# Patient Record
Sex: MALE | Race: BLACK OR AFRICAN AMERICAN | NOT HISPANIC OR LATINO | Employment: FULL TIME | URBAN - METROPOLITAN AREA
[De-identification: names, ages, dates, MRNs, and addresses within clinical notes are randomized per-mention and may not be internally consistent; named-entity substitution may affect disease eponyms.]

---

## 2023-11-17 ENCOUNTER — APPOINTMENT (OUTPATIENT)
Dept: PRIMARY CARE | Facility: CLINIC | Age: 33
End: 2023-11-17
Payer: COMMERCIAL

## 2024-01-02 ENCOUNTER — APPOINTMENT (OUTPATIENT)
Dept: PRIMARY CARE | Facility: CLINIC | Age: 34
End: 2024-01-02
Payer: COMMERCIAL

## 2024-01-11 ENCOUNTER — APPOINTMENT (OUTPATIENT)
Dept: PRIMARY CARE | Facility: CLINIC | Age: 34
End: 2024-01-11
Payer: COMMERCIAL

## 2024-01-12 ENCOUNTER — APPOINTMENT (OUTPATIENT)
Dept: CARDIOLOGY | Facility: HOSPITAL | Age: 34
End: 2024-01-12
Payer: COMMERCIAL

## 2024-01-12 ENCOUNTER — HOSPITAL ENCOUNTER (EMERGENCY)
Facility: HOSPITAL | Age: 34
Discharge: HOME | End: 2024-01-12
Attending: EMERGENCY MEDICINE
Payer: COMMERCIAL

## 2024-01-12 ENCOUNTER — APPOINTMENT (OUTPATIENT)
Dept: RADIOLOGY | Facility: HOSPITAL | Age: 34
End: 2024-01-12
Payer: COMMERCIAL

## 2024-01-12 VITALS
SYSTOLIC BLOOD PRESSURE: 171 MMHG | BODY MASS INDEX: 41.58 KG/M2 | HEIGHT: 71 IN | DIASTOLIC BLOOD PRESSURE: 87 MMHG | WEIGHT: 297 LBS | TEMPERATURE: 98.1 F | HEART RATE: 87 BPM | RESPIRATION RATE: 16 BRPM | OXYGEN SATURATION: 97 %

## 2024-01-12 DIAGNOSIS — R09.81 NASAL CONGESTION: ICD-10-CM

## 2024-01-12 DIAGNOSIS — R05.1 ACUTE COUGH: ICD-10-CM

## 2024-01-12 DIAGNOSIS — J10.1 INFLUENZA A: Primary | ICD-10-CM

## 2024-01-12 LAB
FLUAV RNA RESP QL NAA+PROBE: DETECTED
FLUBV RNA RESP QL NAA+PROBE: NOT DETECTED
RSV RNA RESP QL NAA+PROBE: NOT DETECTED
SARS-COV-2 RNA RESP QL NAA+PROBE: NOT DETECTED
TROPONIN T SERPL-MCNC: 6 NG/L

## 2024-01-12 PROCEDURE — 2500000002 HC RX 250 W HCPCS SELF ADMINISTERED DRUGS (ALT 637 FOR MEDICARE OP, ALT 636 FOR OP/ED): Performed by: PHYSICIAN ASSISTANT

## 2024-01-12 PROCEDURE — 94640 AIRWAY INHALATION TREATMENT: CPT

## 2024-01-12 PROCEDURE — 87502 INFLUENZA DNA AMP PROBE: CPT | Performed by: PHYSICIAN ASSISTANT

## 2024-01-12 PROCEDURE — 93005 ELECTROCARDIOGRAM TRACING: CPT

## 2024-01-12 PROCEDURE — 99283 EMERGENCY DEPT VISIT LOW MDM: CPT | Performed by: EMERGENCY MEDICINE

## 2024-01-12 PROCEDURE — 84484 ASSAY OF TROPONIN QUANT: CPT | Performed by: EMERGENCY MEDICINE

## 2024-01-12 PROCEDURE — 36415 COLL VENOUS BLD VENIPUNCTURE: CPT | Performed by: EMERGENCY MEDICINE

## 2024-01-12 PROCEDURE — 71046 X-RAY EXAM CHEST 2 VIEWS: CPT

## 2024-01-12 RX ORDER — ALBUTEROL SULFATE 90 UG/1
2 AEROSOL, METERED RESPIRATORY (INHALATION) EVERY 6 HOURS PRN
Qty: 18 G | Refills: 0 | Status: SHIPPED | OUTPATIENT
Start: 2024-01-12 | End: 2024-02-11

## 2024-01-12 RX ORDER — BENZONATATE 100 MG/1
100 CAPSULE ORAL 3 TIMES DAILY PRN
Qty: 21 CAPSULE | Refills: 0 | Status: SHIPPED | OUTPATIENT
Start: 2024-01-12

## 2024-01-12 RX ORDER — IPRATROPIUM BROMIDE AND ALBUTEROL SULFATE 2.5; .5 MG/3ML; MG/3ML
3 SOLUTION RESPIRATORY (INHALATION) ONCE
Status: COMPLETED | OUTPATIENT
Start: 2024-01-12 | End: 2024-01-12

## 2024-01-12 RX ORDER — GUAIFENESIN 600 MG/1
600 TABLET, EXTENDED RELEASE ORAL 2 TIMES DAILY
Qty: 14 TABLET | Refills: 0 | Status: SHIPPED | OUTPATIENT
Start: 2024-01-12 | End: 2024-01-19

## 2024-01-12 RX ADMIN — IPRATROPIUM BROMIDE AND ALBUTEROL SULFATE 3 ML: 2.5; .5 SOLUTION RESPIRATORY (INHALATION) at 16:40

## 2024-01-12 ASSESSMENT — COLUMBIA-SUICIDE SEVERITY RATING SCALE - C-SSRS
6. HAVE YOU EVER DONE ANYTHING, STARTED TO DO ANYTHING, OR PREPARED TO DO ANYTHING TO END YOUR LIFE?: NO
1. IN THE PAST MONTH, HAVE YOU WISHED YOU WERE DEAD OR WISHED YOU COULD GO TO SLEEP AND NOT WAKE UP?: NO
2. HAVE YOU ACTUALLY HAD ANY THOUGHTS OF KILLING YOURSELF?: NO

## 2024-01-12 NOTE — Clinical Note
James Fragoso was seen and treated in our emergency department on 1/12/2024.  He may return to work on 01/15/2024.       If you have any questions or concerns, please don't hesitate to call.      Apoorva Washington PA-C

## 2024-01-12 NOTE — DISCHARGE INSTRUCTIONS
*Please follow-up with your primary care doctor in 3 days.  Please take medications as prescribed.  Ensure adequate hydration at home.  Please use Tylenol over-the-counter as needed for muscle aches, or fever    Please return to the emergency department with new or worsening symptoms or the onset of new symptoms.

## 2024-01-12 NOTE — ED PROVIDER NOTES
HPI   Chief Complaint   Patient presents with    Cough     Cough/Congestion/Running nose x 2 weeks. Seen by the PCP last week. COVID & Flu was negative. CXR was done       33-year-old male with past medical history of hypertension who takes no daily meds presenting the emergency department complaints of ongoing sinus congestion and cough x 8 days.  Patient states that his symptoms started last Wednesday.  He went to the urgent care the following Thursday and had a chest x-ray, flu, and COVID-19 testing.  He states that all testing was negative and he was prescribed ibuprofen.  He states over the course of the week, his nasal congestion has been ongoing and worsening.  He has a pressure in his face and feels a lot of mucus doing on his throat.  He states he has also had a continued cough with yellow sputum.  He states that he has had on and off subjective fever and chills.  He denies shortness of breath.  He denies nausea, vomiting, abdominal pain, diarrhea, or constipation.  He states that he has discomfort in his chest and is coughing.  No recent travel, history of cancer, history of clots in his lungs or legs, hormonal use, recent surgeries or traumas, and no hemoptysis.        Please see HPI for pertinent positive and negative ROS.                 No data recorded                Patient History   No past medical history on file.  No past surgical history on file.  No family history on file.  Social History     Tobacco Use    Smoking status: Not on file    Smokeless tobacco: Not on file   Substance Use Topics    Alcohol use: Not on file    Drug use: Not on file       Physical Exam   ED Triage Vitals [01/12/24 1427]   Temp Heart Rate Resp BP   36.7 °C (98.1 °F) 87 16 171/87      SpO2 Temp Source Heart Rate Source Patient Position   97 % Skin -- Sitting      BP Location FiO2 (%)     Left arm --       Physical Exam  GENERAL APPEARANCE: Awake and alert. No acute distress.   VITAL SIGNS: As per the nurses' triage  record.  HEENT: Normocephalic, atraumatic. Extraocular muscles are intact. Conjunctiva are pink. Negative scleral icterus. Mucous membranes are moist. Tongue in the midline. Oropharynx clear, uvula midline.  Postnasal drip appreciated.  TMs dull and intact bilaterally.  External auditory canals are clear bilaterally.  No mastoid bone erythema or edema bilaterally.  NECK: Soft, nontender and supple, full gross ROM, no meningeal signs.  CHEST: Nontender to palpation.  Wheezing in right lower lobe and left lower lobe.  Symmetric rise and fall of chest wall.   HEART: Clear S1 and S2. Regular rate and rhythm. No murmurs appreciated on auscultation.  Strong and equal pulses in the extremities.  ABDOMEN: Soft, nontender, nondistended, positive bowel sounds, no palpable masses.  MUSCULOSKELETAL: The calves are nontender to palpation. Full gross active range of motion. Ambulating on own with no acute difficulties  NEUROLOGICAL: Awake, alert and oriented x 3. Motor power intact in the upper and lower extremities. Sensation is intact to light touch in the upper and lower extremities. Patient answering questions appropriately.   IMMUNOLOGICAL: No lymphatic streaking noted  DERMATOLOGIC: Warm and dry without petechiae, rashes, or ecchymosis noted on visible skin.   PYSCH: Cooperative with appropriate mood and affect.  ED Course & MDM   ED Course as of 01/12/24 1852 Fri Jan 12, 2024   1637 Chest x-ray shows distended small bowel loop in the left upper quadrant.  Patient states that he has no abdominal pain.  He denies any nausea or vomiting.  He denies diarrhea or constipation.  He states that he has been having bowel movements at baseline.  [SC]   1838 Initial Troponin T HS 6, chest pain with coughing and heavy lifting has been for one week. Repeat Troponin T HS not indicated  [SC]      ED Course User Index  [SC] Apoorva Washington PA-C         Diagnoses as of 01/12/24 1852   Influenza A   Acute cough   Nasal congestion        Medical Decision Making  Parts of this chart have been completed using voice recognition software. Please excuse any errors of transcription.  My thought process and reason for plan has been formulated from the time that I saw the patient until the time of disposition and is not specific to one specific moment during their visit and furthermore my MDM encompasses this entire chart and not only this text box.      HPI: Detailed above.    Exam: A medically appropriate exam performed, outlined above, given the known history and presentation.    History obtained from: Patient    Social Determinants of Health considered during this visit: Lives at home    Medications given during visit:  Medications   ipratropium-albuteroL (Duo-Neb) 0.5-2.5 mg/3 mL nebulizer solution 3 mL (3 mL nebulization Given 1/12/24 1640)        Diagnostic/tests  Labs Reviewed   INFLUENZA A AND B PCR - Abnormal       Result Value    Flu A Result Detected (*)     Flu B Result Not Detected      Narrative:     This assay is an in vitro diagnostic multiplex nucleic acid amplification test for the detection and discrimination of Influenza A & B from nasopharyngeal specimens, and has been validated for use at [facility]. Negative results do not preclude Influenza A/B infections, and should not be used as the sole basis for diagnosis, treatment, or other management decisions. If Influenza A/B and RSV PCR results are negative, testing for Parainfluenza virus, Adenovirus and Metapneumovirus is routinely performed for [facility] pediatric oncology and intensive care inpatients, and is available on other patients by placing an add-on request.   SARS-COV-2 PCR, SYMPTOMATIC - Normal    Coronavirus 2019, PCR Not Detected      Narrative:     This assay has received FDA Emergency Use Authorization (EUA) and is only authorized for the duration of time that circumstances exist to justify the authorization of the emergency use of in vitro  diagnostic tests for the detection of SARS-CoV-2 virus and/or diagnosis of COVID-19 infection under section 564(b)(1) of the Act, 21 U.S.C. 360bbb-3(b)(1). This assay is an in vitro diagnostic nucleic acid amplification test for the qualitative detection of SARS-CoV-2 from nasopharyngeal specimens and has been validated for use at [facility]. Negative results do not preclude COVID-19 infections and should not be used as the sole basis for diagnosis, treatment, or other management decisions.     RSV PCR - Normal    RSV PCR Not Detected      Narrative:     This assay is an FDA-cleared, in vitro diagnostic nucleic acid amplification test for the detection of RSV from nasopharyngeal specimens, and has been validated for use at [facility]. Negative results do not preclude RSV infections, and should not be used as the sole basis for diagnosis, treatment, or other management decisions. If Influenza A/B and RSV PCR results are negative, testing for Parainfluenza virus, Adenovirus and Metapneumovirus is routinely performed for pediatric oncology and intensive care inpatients at [facility], and is available on other patients by placing an add-on request.       SERIAL TROPONIN, INITIAL ([facility]) - Normal    Troponin T, High Sensitivity 6     TROPONIN T SERIES, HIGH SENSITIVITY (0, 2 HR, 6 HR)    Narrative:     The following orders were created for panel order Troponin T Series, High Sensitivity (0, 2HR, 6HR).  Procedure                               Abnormality         Status                     ---------                               -----------         ------                     Serial Troponin, Initial...[[id number]]  Normal              Final result               Serial Troponin, 2 Hour ...[[id number]]                                                   Please view results for these tests on the individual orders.   SERIAL TROPONIN,  2 HOUR ([facility])      XR chest 2 views   Final Result   No  acute cardiopulmonary disease.        Distended small bowel loop within left upper quadrant. Clinical   correlation is necessary.        Signed by: Jazmine Tafoya 1/12/2024 4:30 PM   Dictation workstation:   [id number]           Considerations/further MDM:    Patient was seen in conjucntion with my supervising physician,  Dr. Larry. Please refer to his note.    Considered and evaluated for acute bronchitis, acute pneumonia, pneumothorax, acute nasopharyngitis, acute sinusitis, acute tonsillitis, viral syndrome including - COVID-19, influenza A, influenza B, RSV    PERC negative for Pulmonary Embolism     Patient positive for influenza A.  Chest x-ray shows no pneumonia.  After breathing treatment, patient states symptoms are improved.  He still had wheezing and bilateral lower lobes.  He will be treated with benzonatate, albuterol, Mucinex for influenza A.  He was told to follow-up with his primary care doctor in 3 days.  He was told to return the emerged part medially with new or worsening symptoms with the onset of new symptoms.  Patient verbalized understanding and was comfortable discharge plan home.  He was released in good condition.      Procedure  Procedures     Apoorva Washington PA-C  01/12/24 8172

## 2024-01-15 LAB
ATRIAL RATE: 72 BPM
P AXIS: 41 DEGREES
P OFFSET: 174 MS
P ONSET: 123 MS
PR INTERVAL: 184 MS
Q ONSET: 215 MS
QRS COUNT: 12 BEATS
QRS DURATION: 96 MS
QT INTERVAL: 402 MS
QTC CALCULATION(BAZETT): 440 MS
QTC FREDERICIA: 427 MS
R AXIS: 16 DEGREES
T AXIS: 20 DEGREES
T OFFSET: 416 MS
VENTRICULAR RATE: 72 BPM

## 2024-01-23 ENCOUNTER — OFFICE VISIT (OUTPATIENT)
Dept: PRIMARY CARE | Facility: CLINIC | Age: 34
End: 2024-01-23
Payer: COMMERCIAL

## 2024-01-23 VITALS
WEIGHT: 294 LBS | DIASTOLIC BLOOD PRESSURE: 78 MMHG | SYSTOLIC BLOOD PRESSURE: 128 MMHG | RESPIRATION RATE: 16 BRPM | TEMPERATURE: 96.4 F | HEART RATE: 64 BPM | BODY MASS INDEX: 41.16 KG/M2 | HEIGHT: 71 IN

## 2024-01-23 DIAGNOSIS — Z00.00 ANNUAL PHYSICAL EXAM: ICD-10-CM

## 2024-01-23 DIAGNOSIS — L30.9 DERMATITIS: Primary | ICD-10-CM

## 2024-01-23 LAB
APPEARANCE UR: CLEAR
BILIRUB UR QL STRIP: ABNORMAL
COLOR UR: ABNORMAL
GLUCOSE UR STRIP-MCNC: NEGATIVE MG/DL
HGB UR QL STRIP: NEGATIVE
KETONES UR STRIP-MCNC: ABNORMAL MG/DL
LEUKOCYTE ESTERASE UR QL STRIP: NEGATIVE
NITRITE UR QL STRIP: NEGATIVE
PH UR STRIP: 6 [PH]
PROT UR STRIP-MCNC: ABNORMAL MG/DL
SP GR UR STRIP.AUTO: >=1.03
UROBILINOGEN UR STRIP-ACNC: 1 E.U./DL

## 2024-01-23 PROCEDURE — 81003 URINALYSIS AUTO W/O SCOPE: CPT | Performed by: INTERNAL MEDICINE

## 2024-01-23 PROCEDURE — 84443 ASSAY THYROID STIM HORMONE: CPT | Performed by: INTERNAL MEDICINE

## 2024-01-23 PROCEDURE — 99395 PREV VISIT EST AGE 18-39: CPT | Performed by: INTERNAL MEDICINE

## 2024-01-23 PROCEDURE — 80053 COMPREHEN METABOLIC PANEL: CPT | Performed by: INTERNAL MEDICINE

## 2024-01-23 PROCEDURE — 80061 LIPID PANEL: CPT | Performed by: INTERNAL MEDICINE

## 2024-01-23 PROCEDURE — 82306 VITAMIN D 25 HYDROXY: CPT | Performed by: INTERNAL MEDICINE

## 2024-01-23 PROCEDURE — 85025 COMPLETE CBC W/AUTO DIFF WBC: CPT | Performed by: INTERNAL MEDICINE

## 2024-01-23 PROCEDURE — 83036 HEMOGLOBIN GLYCOSYLATED A1C: CPT | Performed by: INTERNAL MEDICINE

## 2024-01-23 ASSESSMENT — ENCOUNTER SYMPTOMS
WOUND: 1
ACTIVITY CHANGE: 1
FATIGUE: 1
COLOR CHANGE: 1

## 2024-01-23 ASSESSMENT — PAIN SCALES - GENERAL: PAINLEVEL: 0-NO PAIN

## 2024-01-23 NOTE — PROGRESS NOTES
Subjective    James Fragoso is a 33 y.o. male who presents to establish patient physician  relationship.  Patient is here for Annual Physical exam.  Concerned about skin condition.  Has few keloid scars, interested to see dermatology.    HPI  This is a 33 years Old man with medical H of pilonidal cyst, recent Influenza A 1/12/2024.  Patient is presented to establish patient physician relationship.  Patient is here for Annual Physical exam.    Concerned about skin issues, has Sensitive skin.  Has propensity to form keloid scars.  Recently, seen by ED for Influenza A 1/12/2024.  Feeling better now.      Review of Systems   Constitutional:  Positive for activity change and fatigue.   Skin:  Positive for color change and wound.       Objective        Vitals:    01/23/24 1524   BP: 128/78   Pulse: 64   Resp: 16   Temp: 35.8 °C (96.4 °F)        Physical Exam  HENT:      Head: Normocephalic.      Nose: Nose normal.   Eyes:      Pupils: Pupils are equal, round, and reactive to light.   Cardiovascular:      Rate and Rhythm: Regular rhythm.      Pulses: Normal pulses.      Heart sounds: Normal heart sounds.   Pulmonary:      Effort: Pulmonary effort is normal.   Musculoskeletal:         General: Normal range of motion.   Skin:     General: Skin is warm.   Neurological:      Mental Status: He is alert.   Psychiatric:         Mood and Affect: Mood normal.       Diagnoses and all orders for this visit:  Dermatitis (Primary)  -     Cancel: Referral to Dermatology  -     Referral to Dermatology  Annual physical exam  -     CBC  -     Comprehensive Metabolic Panel  -     Cancel: ECG 12 Lead  -     Hemoglobin A1C  -     Lipid Panel  -     Thyroid Stimulating Hormone  -     POCT UA (Automated) docked device  -     Vitamin D 25-Hydroxy,Total (for eval of Vitamin D levels)  Other orders  -     POCT URINALYSIS AUTOMATED     H of pilonidal cyst.  Sensitive skin.  Avoid infections.  See dermatology.    Keloids .  See dermatology.    - Obese  with BMI 41.00.  Diet, exercise.  Ideal BMI is less, than 25.    I will notify you about BW results.     Loulou Gloria MD

## 2024-01-24 ENCOUNTER — TELEPHONE (OUTPATIENT)
Dept: PRIMARY CARE | Facility: CLINIC | Age: 34
End: 2024-01-24
Payer: COMMERCIAL

## 2024-01-24 DIAGNOSIS — D64.9 ANEMIA, UNSPECIFIED TYPE: Primary | ICD-10-CM

## 2024-01-24 DIAGNOSIS — E55.9 VITAMIN D DEFICIENCY: ICD-10-CM

## 2024-01-24 RX ORDER — ERGOCALCIFEROL 1.25 MG/1
50000 CAPSULE ORAL
Qty: 4 CAPSULE | Refills: 2 | Status: SHIPPED | OUTPATIENT
Start: 2024-01-24 | End: 2024-04-17

## 2024-01-24 NOTE — TELEPHONE ENCOUNTER
Left message regarding blood work results.  Patient has extremely low vitamin D level.  Start taking vitamin D weekly.  Cholesterol is well-controlled 147, LDL is 94.  Patient has slight anemia hemoglobin of 12.1, hematocrit 36.8.  See GI.

## 2024-01-25 ENCOUNTER — TELEPHONE (OUTPATIENT)
Dept: PRIMARY CARE | Facility: CLINIC | Age: 34
End: 2024-01-25
Payer: COMMERCIAL

## 2024-01-26 ENCOUNTER — TELEPHONE (OUTPATIENT)
Dept: PRIMARY CARE | Facility: CLINIC | Age: 34
End: 2024-01-26
Payer: COMMERCIAL

## 2024-02-16 ENCOUNTER — APPOINTMENT (OUTPATIENT)
Dept: DERMATOLOGY | Facility: CLINIC | Age: 34
End: 2024-02-16
Payer: COMMERCIAL

## 2024-03-14 ENCOUNTER — OFFICE VISIT (OUTPATIENT)
Dept: DERMATOLOGY | Facility: CLINIC | Age: 34
End: 2024-03-14
Payer: COMMERCIAL

## 2024-03-14 DIAGNOSIS — L73.9 FOLLICULITIS: ICD-10-CM

## 2024-03-14 PROCEDURE — 88312 SPECIAL STAINS GROUP 1: CPT | Performed by: DERMATOLOGY

## 2024-03-14 PROCEDURE — 99203 OFFICE O/P NEW LOW 30 MIN: CPT

## 2024-03-14 PROCEDURE — 88305 TISSUE EXAM BY PATHOLOGIST: CPT | Performed by: DERMATOLOGY

## 2024-03-14 PROCEDURE — 11104 PUNCH BX SKIN SINGLE LESION: CPT

## 2024-03-14 PROCEDURE — 87070 CULTURE OTHR SPECIMN AEROBIC: CPT

## 2024-03-14 RX ORDER — CLINDAMYCIN PHOSPHATE 10 MG/ML
1 SOLUTION TOPICAL 2 TIMES DAILY
Qty: 69 EACH | Refills: 11 | Status: SHIPPED | OUTPATIENT
Start: 2024-03-14

## 2024-03-14 NOTE — PROGRESS NOTES
Subjective   HPI: James Fragoso is a 34 y.o. male is a new patient here for evaluation and treatment of boil like lesions on the beard and groin.   -going on 2-3 years  -drains a little bit of purulent pus   -not painful  -not pruritic  -had surgery 2018/2019 in groin region - trouble healing the areas    ROS: No other skin or systemic complaints other than what is documented elsewhere in the note.    ALLERGIES: Patient has no known allergies.    SOCIAL:  reports that he has been smoking. He has been exposed to tobacco smoke. He has never used smokeless tobacco. He reports current alcohol use. He reports that he does not use drugs.    Objective   Right Suprapubic Area  Numerous 2-3 mm erythematous inflamed papules that spontaneous drain purulent material        Assessment/Plan   1. Folliculitis  Right Suprapubic Area    Wound culture to r/o staph. Punch bx today as the area that was bx looks like a pyogenic granuloma. Discussed wound care including applying topical bacitracin or polysporin once daily x5 days.    Starting patient on clindamycin pledget wipes BID.    Skin biopsy - Right Suprapubic Area    Tissue/Wound Culture/Smear - Right Suprapubic Area    Specimen 1 - Dermatopathology- DERM LAB  Differential Diagnosis: Folliculitis  Check Margins Yes/No?:    Comments:    Dermpath Lab: Routine Histopathology (formalin-fixed tissue)    Related Medications  clindamycin (Cleocin T) 1 % swab  Apply 1 Application topically 2 times a day.         FOLLOW UP: 1 week - suture removal and bx results    The patient was encouraged to contact me with any further questions or concerns.  Sonam Whipple PA-C  3/14/2024

## 2024-03-16 LAB
BACTERIA SPEC CULT: ABNORMAL
BACTERIA SPEC CULT: ABNORMAL
GRAM STN SPEC: ABNORMAL
GRAM STN SPEC: ABNORMAL

## 2024-03-17 DIAGNOSIS — A49.01 METHICILLIN SUSCEPTIBLE STAPHYLOCOCCUS AUREUS INFECTION: ICD-10-CM

## 2024-03-17 DIAGNOSIS — L73.9 FOLLICULITIS: Primary | ICD-10-CM

## 2024-03-17 RX ORDER — DOXYCYCLINE 100 MG/1
CAPSULE ORAL
Qty: 11 CAPSULE | Refills: 0 | Status: SHIPPED | OUTPATIENT
Start: 2024-03-17

## 2024-03-27 ENCOUNTER — APPOINTMENT (OUTPATIENT)
Dept: SURGERY | Facility: CLINIC | Age: 34
End: 2024-03-27
Payer: COMMERCIAL

## 2024-04-01 LAB
LABORATORY COMMENT REPORT: NORMAL
PATH REPORT.FINAL DX SPEC: NORMAL
PATH REPORT.GROSS SPEC: NORMAL
PATH REPORT.MICROSCOPIC SPEC OTHER STN: NORMAL
PATH REPORT.RELEVANT HX SPEC: NORMAL
PATH REPORT.TOTAL CANCER: NORMAL

## 2024-04-02 ENCOUNTER — OFFICE VISIT (OUTPATIENT)
Dept: GASTROENTEROLOGY | Facility: HOSPITAL | Age: 34
End: 2024-04-02
Payer: COMMERCIAL

## 2024-04-02 ENCOUNTER — LAB (OUTPATIENT)
Dept: LAB | Facility: LAB | Age: 34
End: 2024-04-02
Payer: COMMERCIAL

## 2024-04-02 VITALS — WEIGHT: 296 LBS | HEIGHT: 72 IN | BODY MASS INDEX: 40.09 KG/M2

## 2024-04-02 DIAGNOSIS — D64.9 ANEMIA, UNSPECIFIED TYPE: ICD-10-CM

## 2024-04-02 LAB
BASOPHILS # BLD AUTO: 0.03 X10*3/UL (ref 0–0.1)
BASOPHILS NFR BLD AUTO: 0.2 %
EOSINOPHIL # BLD AUTO: 0.16 X10*3/UL (ref 0–0.7)
EOSINOPHIL NFR BLD AUTO: 1.1 %
ERYTHROCYTE [DISTWIDTH] IN BLOOD BY AUTOMATED COUNT: 14.5 % (ref 11.5–14.5)
FERRITIN SERPL-MCNC: 94 NG/ML (ref 20–300)
FOLATE SERPL-MCNC: 12.9 NG/ML
GLIADIN PEPTIDE IGA SER IA-ACNC: 3.5 U/ML
HCT VFR BLD AUTO: 37.3 % (ref 41–52)
HGB BLD-MCNC: 12 G/DL (ref 13.5–17.5)
HGB RETIC QN: 33 PG (ref 28–38)
IMM GRANULOCYTES # BLD AUTO: 0.16 X10*3/UL (ref 0–0.7)
IMM GRANULOCYTES NFR BLD AUTO: 1.1 % (ref 0–0.9)
IMMATURE RETIC FRACTION: 31.2 %
IRON SATN MFR SERPL: 22 % (ref 25–45)
IRON SERPL-MCNC: 78 UG/DL (ref 35–150)
LYMPHOCYTES # BLD AUTO: 3.09 X10*3/UL (ref 1.2–4.8)
LYMPHOCYTES NFR BLD AUTO: 21.6 %
MCH RBC QN AUTO: 28.4 PG (ref 26–34)
MCHC RBC AUTO-ENTMCNC: 32.2 G/DL (ref 32–36)
MCV RBC AUTO: 88 FL (ref 80–100)
MONOCYTES # BLD AUTO: 0.89 X10*3/UL (ref 0.1–1)
MONOCYTES NFR BLD AUTO: 6.2 %
NEUTROPHILS # BLD AUTO: 9.96 X10*3/UL (ref 1.2–7.7)
NEUTROPHILS NFR BLD AUTO: 69.8 %
NRBC BLD-RTO: 0 /100 WBCS (ref 0–0)
PLATELET # BLD AUTO: 420 X10*3/UL (ref 150–450)
RBC # BLD AUTO: 4.22 X10*6/UL (ref 4.5–5.9)
RETICS #: 0.06 X10*6/UL (ref 0.02–0.12)
RETICS/RBC NFR AUTO: 1.5 % (ref 0.5–2)
TIBC SERPL-MCNC: 354 UG/DL (ref 240–445)
TTG IGA SER IA-ACNC: <1 U/ML
UIBC SERPL-MCNC: 276 UG/DL (ref 110–370)
VIT B12 SERPL-MCNC: 406 PG/ML (ref 211–911)
WBC # BLD AUTO: 14.3 X10*3/UL (ref 4.4–11.3)

## 2024-04-02 PROCEDURE — 83540 ASSAY OF IRON: CPT

## 2024-04-02 PROCEDURE — 99214 OFFICE O/P EST MOD 30 MIN: CPT | Performed by: INTERNAL MEDICINE

## 2024-04-02 PROCEDURE — 82746 ASSAY OF FOLIC ACID SERUM: CPT

## 2024-04-02 PROCEDURE — 83516 IMMUNOASSAY NONANTIBODY: CPT

## 2024-04-02 PROCEDURE — 83550 IRON BINDING TEST: CPT

## 2024-04-02 PROCEDURE — 85045 AUTOMATED RETICULOCYTE COUNT: CPT

## 2024-04-02 PROCEDURE — 99204 OFFICE O/P NEW MOD 45 MIN: CPT | Performed by: INTERNAL MEDICINE

## 2024-04-02 PROCEDURE — 82728 ASSAY OF FERRITIN: CPT

## 2024-04-02 PROCEDURE — 85025 COMPLETE CBC W/AUTO DIFF WBC: CPT

## 2024-04-02 PROCEDURE — 82607 VITAMIN B-12: CPT

## 2024-04-02 PROCEDURE — 36415 COLL VENOUS BLD VENIPUNCTURE: CPT

## 2024-04-02 ASSESSMENT — ENCOUNTER SYMPTOMS
NEUROLOGICAL NEGATIVE: 1
ENDOCRINE NEGATIVE: 1
CONSTITUTIONAL NEGATIVE: 1
GASTROINTESTINAL NEGATIVE: 1
CARDIOVASCULAR NEGATIVE: 1
HEMATOLOGIC/LYMPHATIC NEGATIVE: 1
PSYCHIATRIC NEGATIVE: 1
RESPIRATORY NEGATIVE: 1
MUSCULOSKELETAL NEGATIVE: 1
EYES NEGATIVE: 1
ALLERGIC/IMMUNOLOGIC NEGATIVE: 1

## 2024-04-02 NOTE — PROGRESS NOTES
Anemia and no gastrointestinal symptoms    History of Present Illness:   James Fragoso is a 34 y.o. male with PMHx of folliculitis on doxycycline who presents to GI clinic for consultation due to mild anemia and low vitamin D.  The patient apparently had blood work by his primary care doctor noted a low vitamin D and some mild nonspecific anemia.  He denies difficulty swallowing, pain on swallowing, black stool or bright red blood per rectum.  He denies chronic diarrhea.  He is here today and is unclear why he is here.  He did see dermatology for folliculitis.    Review of Systems  Review of Systems   Constitutional: Negative.    HENT: Negative.     Eyes: Negative.    Respiratory: Negative.     Cardiovascular: Negative.    Gastrointestinal: Negative.    Endocrine: Negative.    Genitourinary: Negative.    Musculoskeletal: Negative.    Skin: Negative.    Allergic/Immunologic: Negative.    Neurological: Negative.    Hematological: Negative.    Psychiatric/Behavioral: Negative.     All other systems reviewed and are negative.      Allergies  No Known Allergies    Medications  Current Outpatient Medications   Medication Instructions    albuterol 90 mcg/actuation inhaler 2 puffs, inhalation, Every 6 hours PRN    benzonatate (TESSALON) 100 mg, oral, 3 times daily PRN, Do not crush or chew.    clindamycin (Cleocin T) 1 % swab 1 Application, Topical, 2 times daily    doxycycline (Vibramycin) 100 mg capsule Take 2 capsules po today then take 1 capsule po every day x9 daysTake with at least 8 ounces (large glass) of water, do not lie down for 30 minutes after    ergocalciferol (VITAMIN D-2) 50,000 Units, oral, Weekly        Objective   There were no vitals taken for this visit.   Physical Exam  Constitutional:       Appearance: Normal appearance.   Eyes:      Conjunctiva/sclera: Conjunctivae normal.   Cardiovascular:      Rate and Rhythm: Normal rate and regular rhythm.   Pulmonary:      Effort: Pulmonary effort is normal.       "Breath sounds: Normal breath sounds.   Abdominal:      General: Abdomen is flat. Bowel sounds are normal.      Palpations: Abdomen is soft.   Musculoskeletal:         General: Normal range of motion.      Cervical back: Normal range of motion.   Neurological:      Mental Status: He is alert.           No results found for: \"WBC\", \"HGB\", \"HCT\", \"PLT\"  No results found for: \"NA\", \"K\", \"CL\", \"CO2\", \"BUN\", \"CREATININE\", \"CALCIUM\", \"PROT\", \"BILITOT\", \"ALKPHOS\", \"ALT\", \"AST\", \"GLUCOSE\"        James Fragoso is a 34 y.o. male who presents to GI clinic for anemia.    Anemia  Patient is a 34-year-old with a history of anemia of unclear etiology and I am having difficulty seeing the full evaluation of his anemia and we will order B12, folate, iron studies and repeat CBC.  If there is iron deficiency we may consider endoscopy and/or colonoscopy.  All his questions were answered and I will call him with the results when available.  He apparently has a family history of vitamin B12 deficiency in his mother.         Matt Mckeon MD         "

## 2024-04-02 NOTE — ASSESSMENT & PLAN NOTE
Patient is a 34-year-old with a history of anemia of unclear etiology and I am having difficulty seeing the full evaluation of his anemia and we will order B12, folate, iron studies and repeat CBC.  If there is iron deficiency we may consider endoscopy and/or colonoscopy.  All his questions were answered and I will call him with the results when available.  He apparently has a family history of vitamin B12 deficiency in his mother.

## 2024-04-03 DIAGNOSIS — D64.9 ANEMIA, UNSPECIFIED TYPE: Primary | ICD-10-CM

## 2024-04-04 LAB
GLIADIN PEPTIDE IGG SER IA-ACNC: <0.56 FLU (ref 0–4.99)
TTG IGG SER IA-ACNC: <0.82 FLU (ref 0–4.99)

## 2024-04-10 NOTE — PROGRESS NOTES
Subjective   HPI: James Fragoso is a 34 y.o. male who presents in office for evaluation and treatment of folliculitis.  Using the clindamycin swabs daily.  Patient has not noticed much of an improvement however the folliculitis has not gotten any worse.  He wants more aggressive treatment.    ROS: No other skin or systemic complaints other than what is documented elsewhere in the note.    ALLERGIES: Patient has no known allergies.    SOCIAL:  reports that he has been smoking. He has been exposed to tobacco smoke. He has never used smokeless tobacco. He reports current alcohol use. He reports that he does not use drugs.    Objective   Head - Anterior (Face), Left Axilla, Pubic, Right Axilla  Multiple 2-5 mm erythematous papules and pustules.          Assessment/Plan   1. Folliculitis  Head - Anterior (Face); Pubic; Left Axilla; Right Axilla    Discussed biopsy results from 3/14/2024 [id number] which showed folliculitis cannot rule out HS.  Discussed with patient at this time I do not want to make the diagnosis of at bedtime because he is not having the classic symptoms of tunneling with the cyst or over exaggerated scarring.  I feel this is more of a folliculitis at this time however we cannot rule out HS.    Recommended starting Ceftin 250 mg p.o. daily as well as Epiduo forte applied nightly with the clindamycin swabs applied every morning.  Patient has a couple inflamed painful follicular cysts which I recommended intralesional Kenalog to help calm the inflammation and promote healing.    Related Medications  clindamycin (Cleocin T) 1 % swab  Apply 1 Application topically 2 times a day.    adapalene-benzoyl peroxide (Epiduo Forte) 0.3-2.5 % gel with pump  Apply 1 Application topically once daily at bedtime.    cefuroxime (Ceftin) 250 mg tablet  Take 1 tablet by mouth once daily    triamcinolone acetonide (Kenalog) injection 10 mg           FOLLOW UP: 6 to 8 weeks    The patient was encouraged to contact me with any  further questions or concerns.  Sonam Whipple PA-C  4/16/2024

## 2024-04-11 ENCOUNTER — OFFICE VISIT (OUTPATIENT)
Dept: DERMATOLOGY | Facility: CLINIC | Age: 34
End: 2024-04-11
Payer: COMMERCIAL

## 2024-04-11 DIAGNOSIS — L73.9 FOLLICULITIS: Primary | ICD-10-CM

## 2024-04-11 PROCEDURE — 11900 INJECT SKIN LESIONS </W 7: CPT

## 2024-04-11 PROCEDURE — 99213 OFFICE O/P EST LOW 20 MIN: CPT

## 2024-04-11 RX ORDER — ADAPALENE AND BENZOYL PEROXIDE 3; 25 MG/G; MG/G
1 GEL TOPICAL NIGHTLY
Qty: 3 G | Refills: 0 | Status: SHIPPED | OUTPATIENT
Start: 2024-04-11 | End: 2024-05-11

## 2024-04-11 RX ORDER — CEFUROXIME AXETIL 250 MG/1
TABLET ORAL
Qty: 30 TABLET | Refills: 2 | Status: SHIPPED | OUTPATIENT
Start: 2024-04-11

## 2024-07-02 ENCOUNTER — APPOINTMENT (OUTPATIENT)
Dept: DERMATOLOGY | Facility: CLINIC | Age: 34
End: 2024-07-02
Payer: COMMERCIAL

## 2024-07-02 DIAGNOSIS — L73.9 FOLLICULITIS: ICD-10-CM

## 2024-07-02 DIAGNOSIS — L21.9 SEBORRHEIC DERMATITIS: ICD-10-CM

## 2024-07-02 RX ORDER — CHLORHEXIDINE GLUCONATE 40 MG/ML
SOLUTION TOPICAL
Qty: 473 ML | Refills: 2 | Status: SHIPPED | OUTPATIENT
Start: 2024-07-02

## 2024-07-02 RX ORDER — FLUOCINONIDE TOPICAL SOLUTION USP, 0.05% 0.5 MG/ML
SOLUTION TOPICAL 2 TIMES DAILY
Qty: 60 ML | Refills: 6 | Status: SHIPPED | OUTPATIENT
Start: 2024-07-02

## 2024-07-02 RX ORDER — CHLORHEXIDINE GLUCONATE ORAL RINSE 1.2 MG/ML
15 SOLUTION DENTAL AS NEEDED
Qty: 120 ML | Refills: 0 | Status: CANCELLED | OUTPATIENT
Start: 2024-07-02 | End: 2024-07-16

## 2024-07-02 RX ORDER — LIDOCAINE HYDROCHLORIDE 10 MG/ML
5 INJECTION INFILTRATION; PERINEURAL ONCE
Status: COMPLETED | OUTPATIENT
Start: 2024-07-02 | End: 2024-07-02

## 2024-07-02 RX ORDER — CICLOPIROX 1 G/100ML
SHAMPOO TOPICAL
Qty: 120 ML | Refills: 6 | Status: SHIPPED | OUTPATIENT
Start: 2024-07-02

## 2024-07-02 NOTE — PROGRESS NOTES
Subjective   HPI: James Fragoso is a 34 y.o. male who presents in office for evaluation and treatment of folliculitis. Doing better but there is one area on the right pubic area that is thickened and when pressure is applied there is some drainage. Also has concerns about pruritic scalp and beard.    ROS: No other skin or systemic complaints other than what is documented elsewhere in the note.    ALLERGIES: Patient has no known allergies.    SOCIAL:  reports that he has been smoking. He has been exposed to tobacco smoke. He has never used smokeless tobacco. He reports current alcohol use. He reports that he does not use drugs.    Objective   Head - Anterior (Face), Left Axilla, Pubic, Right Axilla  On the right mons region there is a roughly 3 cm cystic tract that is thickened and ttp    Scalp  Symmetric erythematous patches overlying greasy scales.          Assessment/Plan   1. Folliculitis  Head - Anterior (Face); Pubic; Left Axilla; Right Axilla    ILK. Discontinue ceftin since he does not feel it is doing much. Start hibiclens. May need excision.    Related Medications  clindamycin (Cleocin T) 1 % swab  Apply 1 Application topically 2 times a day.    triamcinolone acetonide (Kenalog) injection 10 mg      lidocaine (Xylocaine) 10 mg/mL (1 %) injection 5 mL      chlorhexidine (Hibiclens) 4 % external liquid  Wash folliculitis areas with once daily.    2. Seborrheic dermatitis  Scalp    Start:  Ciclopirox shampoo  Fluocinonide solution    Discussed nature of seborrheic dermatitis with patient.  I recommended starting treatment with ciclopirox shampoo and fluocinonide solution.  Discussed the importance of washing hair every day.  Patient verbalizes understanding and opts for treatment today.     Related Medications  fluocinonide (Lidex) 0.05 % external solution  Apply topically 2 times a day. Apply topically to scalp/beard BID.    ciclopirox 1 % shampoo  Wet hair and apply shampoo to scalp/beard. Lather and leave on  for 3 minutes. Rinse. Do this twice a week at night.         FOLLOW UP: 4 weeks    The patient was encouraged to contact me with any further questions or concerns.  Sonam Whipple PA-C  7/4/2024

## 2024-07-25 DIAGNOSIS — L73.9 FOLLICULITIS: ICD-10-CM

## 2024-07-25 RX ORDER — ADAPALENE AND BENZOYL PEROXIDE 3; 25 MG/G; MG/G
1 GEL TOPICAL NIGHTLY
Qty: 45 G | Refills: 3 | Status: SHIPPED | OUTPATIENT
Start: 2024-07-25

## 2024-08-19 ENCOUNTER — APPOINTMENT (OUTPATIENT)
Dept: DERMATOLOGY | Facility: CLINIC | Age: 34
End: 2024-08-19
Payer: COMMERCIAL

## 2024-08-19 DIAGNOSIS — L72.0 EPIDERMAL CYST: Primary | ICD-10-CM

## 2024-08-19 DIAGNOSIS — L21.9 SEBORRHEIC DERMATITIS: ICD-10-CM

## 2024-08-19 DIAGNOSIS — L73.9 FOLLICULITIS: ICD-10-CM

## 2024-08-19 PROCEDURE — 99213 OFFICE O/P EST LOW 20 MIN: CPT

## 2024-08-19 NOTE — PROGRESS NOTES
Subjective   HPI: James Fragoso is a 34 y.o. male who presents in office for evaluation and treatment of folliculitis.  Patient states he has not had any new areas.  The 1 cystic tract in the groin region has grown in size and continues to express purulent material.  Intralesional Kenalog is not working.  Patient states he did not  the medications and has not been using any medications on this area.  He would like this area to be cut out.    ROS: No other skin or systemic complaints other than what is documented elsewhere in the note.    ALLERGIES: Patient has no known allergies.    SOCIAL:  reports that he has been smoking. He has been exposed to tobacco smoke. He has never used smokeless tobacco. He reports current alcohol use. He reports that he does not use drugs.    Objective   Pubic  7CM length X 3 CM width     1CM x 1CM     Head - Anterior (Face), Left Axilla, Pubic, Right Axilla      Scalp  Symmetric erythematous patches overlying greasy scales.          Assessment/Plan   Epidermal cyst  Pubic    Clinically consistent with a cyst.  Discussed with patient that at this point I think excision is warranted.  Intralesional Kenalog as well as topical and oral therapies are not working.    Folliculitis  Head - Anterior (Face); Pubic; Left Axilla; Right Axilla    Patient did not  the clindamycin, Hibiclens, and has not been using the adapalene.  No new folliculitis areas present.  He continues to have the cystic area.  This area needs excised.    Related Medications  clindamycin (Cleocin T) 1 % swab  Apply 1 Application topically 2 times a day.    chlorhexidine (Hibiclens) 4 % external liquid  Wash folliculitis areas with once daily.    adapalene-benzoyl peroxide 0.3-2.5 % gel with pump  Apply 1 Application topically once daily at bedtime.    Seborrheic dermatitis  Scalp    Start:  Ciclopirox shampoo  Fluocinonide solution    Discussed nature of seborrheic dermatitis with patient.  I recommended starting  treatment with ciclopirox shampoo and fluocinonide solution.  Discussed the importance of washing hair every day.  Patient verbalizes understanding and opts for treatment today.     Related Medications  fluocinonide (Lidex) 0.05 % external solution  Apply topically 2 times a day. Apply topically to scalp/beard BID.    ciclopirox 1 % shampoo  Wet hair and apply shampoo to scalp/beard. Lather and leave on for 3 minutes. Rinse. Do this twice a week at night.         FOLLOW UP: Please schedule patient to establish care for excision of the cystic tract    The patient was encouraged to contact me with any further questions or concerns.  Sonam Whipple PA-C  8/26/2024

## 2024-09-18 ENCOUNTER — APPOINTMENT (OUTPATIENT)
Dept: DERMATOLOGY | Facility: CLINIC | Age: 34
End: 2024-09-18
Payer: COMMERCIAL

## 2025-04-16 ENCOUNTER — HOSPITAL ENCOUNTER (OUTPATIENT)
Dept: RADIOLOGY | Facility: CLINIC | Age: 35
Discharge: HOME | End: 2025-04-16
Payer: COMMERCIAL

## 2025-04-16 ENCOUNTER — APPOINTMENT (OUTPATIENT)
Dept: PRIMARY CARE | Facility: CLINIC | Age: 35
End: 2025-04-16
Payer: COMMERCIAL

## 2025-04-16 VITALS
TEMPERATURE: 97.4 F | DIASTOLIC BLOOD PRESSURE: 90 MMHG | BODY MASS INDEX: 44.1 KG/M2 | SYSTOLIC BLOOD PRESSURE: 134 MMHG | HEART RATE: 62 BPM | HEIGHT: 71 IN | WEIGHT: 315 LBS

## 2025-04-16 DIAGNOSIS — M79.671 RIGHT FOOT PAIN: ICD-10-CM

## 2025-04-16 DIAGNOSIS — Z00.00 ANNUAL PHYSICAL EXAM: Primary | ICD-10-CM

## 2025-04-16 DIAGNOSIS — R82.90 ABNORMAL URINALYSIS: ICD-10-CM

## 2025-04-16 DIAGNOSIS — L30.9 DERMATITIS: ICD-10-CM

## 2025-04-16 DIAGNOSIS — Z13.9 SCREENING DUE: ICD-10-CM

## 2025-04-16 LAB
ALBUMIN SERPL BCP-MCNC: 3.7 G/DL (ref 3.4–5)
ALP SERPL-CCNC: 85 U/L (ref 45–117)
ALT SERPL W P-5'-P-CCNC: 32 U/L (ref 14–59)
ANION GAP SERPL CALC-SCNC: 12 MMOL/L (ref 10–20)
APPEARANCE UR: CLEAR
AST SERPL W P-5'-P-CCNC: 30 U/L (ref 15–37)
BASOPHILS # BLD AUTO: 0.06 X10*3/UL (ref 0.1–1.6)
BASOPHILS NFR BLD AUTO: 0.55 % (ref 0–0.3)
BILIRUB SERPL-MCNC: 0.6 MG/DL (ref 0.2–1)
BILIRUB UR QL STRIP: NEGATIVE
BUN SERPL-MCNC: 9 MG/DL (ref 7–18)
CALCIUM SERPL-MCNC: 9.2 MG/DL (ref 8.5–10.1)
CHLORIDE SERPL-SCNC: 102 MMOL/L (ref 98–107)
CHOLEST SERPL-MCNC: 157 MG/DL (ref 0–199)
CHOLESTEROL/HDL RATIO: 4.2 (ref 4.2–7)
CO2 SERPL-SCNC: 29 MMOL/L (ref 21–32)
COLOR UR: YELLOW
CREAT SERPL-MCNC: 0.77 MG/DL (ref 0.6–1.1)
EGFRCR SERPLBLD CKD-EPI 2021: >90 ML/MIN/1.73M*2
EOSINOPHIL # BLD AUTO: 0.23 X10*3/UL (ref 0.04–0.5)
EOSINOPHIL NFR BLD AUTO: 1.91 % (ref 0.7–7)
ERYTHROCYTE [DISTWIDTH] IN BLOOD BY AUTOMATED COUNT: 14.2 % (ref 11.5–14.5)
GLUCOSE SERPL-MCNC: 89 MG/DL (ref 74–100)
GLUCOSE UR STRIP-MCNC: NEGATIVE MG/DL
HBA1C MFR BLD: 6 %
HCT VFR BLD AUTO: 37.2 % (ref 38.4–51.3)
HDLC SERPL-MCNC: 37 MG/DL (ref 40–59)
HGB BLD-MCNC: 12.83 G/DL (ref 12.7–17)
HGB UR QL STRIP: NEGATIVE
IS PATIENT FASTING: YES
KETONES UR STRIP-MCNC: NEGATIVE MG/DL
LDLC SERPL DIRECT ASSAY-MCNC: 85 MG/DL (ref 0–100)
LEUKOCYTE ESTERASE UR QL STRIP: ABNORMAL
LYMPHOCYTES # BLD AUTO: 2.98 X10*3/UL (ref 0–6)
LYMPHOCYTES NFR BLD AUTO: 25.24 % (ref 20.5–51.1)
MCH RBC QN AUTO: 29.8 PG (ref 26–32)
MCHC RBC AUTO-ENTMCNC: 34.5 G/DL (ref 31–38)
MCV RBC AUTO: 86.4 FL (ref 80–96)
MONOCYTES # BLD AUTO: 0.97 X10*3/UL (ref 1.6–24.9)
MONOCYTES NFR BLD AUTO: 8.26 % (ref 1.7–9.3)
NEUTROPHILS # BLD AUTO: 7.56 X10*3/UL (ref 1.4–6.5)
NEUTROPHILS NFR BLD AUTO: 64.04 % (ref 42.2–75.2)
NITRITE UR QL STRIP: NEGATIVE
PH UR STRIP: 7 [PH]
PLATELET # BLD AUTO: 311.9 X10*3/UL (ref 150–450)
PMV BLD AUTO: 8.12 FL (ref 7.8–11)
POTASSIUM SERPL-SCNC: 4.4 MMOL/L (ref 3.5–5.1)
PROT SERPL-MCNC: 9.1 G/DL (ref 6.4–8.2)
PROT UR STRIP-MCNC: ABNORMAL MG/DL
PSA SERPL-MCNC: 0.39 NG/ML
RBC # BLD AUTO: 4.3 X10*6/UL (ref 4.1–5.6)
SODIUM SERPL-SCNC: 139 MMOL/L (ref 136–145)
SP GR UR STRIP.AUTO: 1.02
TRIGL SERPL-MCNC: 164 MG/DL
TSH SERPL-ACNC: 1.72 MIU/L (ref 0.44–3.98)
UROBILINOGEN UR STRIP-ACNC: 0.2 E.U./DL
WBC # BLD AUTO: 11.8 X10*3/UL (ref 4.5–10.5)

## 2025-04-16 PROCEDURE — 73630 X-RAY EXAM OF FOOT: CPT | Mod: RT

## 2025-04-16 PROCEDURE — 73630 X-RAY EXAM OF FOOT: CPT | Mod: RIGHT SIDE | Performed by: RADIOLOGY

## 2025-04-16 RX ORDER — DOXYCYCLINE 100 MG/1
100 CAPSULE ORAL 2 TIMES DAILY
Qty: 14 CAPSULE | Refills: 4 | Status: SHIPPED | OUTPATIENT
Start: 2025-04-16 | End: 2025-04-23

## 2025-04-16 ASSESSMENT — PATIENT HEALTH QUESTIONNAIRE - PHQ9
1. LITTLE INTEREST OR PLEASURE IN DOING THINGS: NOT AT ALL
2. FEELING DOWN, DEPRESSED OR HOPELESS: NOT AT ALL
SUM OF ALL RESPONSES TO PHQ9 QUESTIONS 1 AND 2: 0

## 2025-04-16 ASSESSMENT — COLUMBIA-SUICIDE SEVERITY RATING SCALE - C-SSRS
2. HAVE YOU ACTUALLY HAD ANY THOUGHTS OF KILLING YOURSELF?: NO
1. IN THE PAST MONTH, HAVE YOU WISHED YOU WERE DEAD OR WISHED YOU COULD GO TO SLEEP AND NOT WAKE UP?: NO
6. HAVE YOU EVER DONE ANYTHING, STARTED TO DO ANYTHING, OR PREPARED TO DO ANYTHING TO END YOUR LIFE?: NO

## 2025-04-16 ASSESSMENT — ENCOUNTER SYMPTOMS
COLOR CHANGE: 1
LOSS OF SENSATION IN FEET: 0
OCCASIONAL FEELINGS OF UNSTEADINESS: 0
ACTIVITY CHANGE: 1
ABDOMINAL PAIN: 0
WOUND: 1
ABDOMINAL DISTENTION: 0
DIARRHEA: 0
DEPRESSION: 0
FATIGUE: 1

## 2025-04-16 ASSESSMENT — PAIN SCALES - GENERAL: PAINLEVEL_OUTOF10: 0-NO PAIN
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
## 2025-04-17 DIAGNOSIS — E88.810 INSULIN RESISTANCE SYNDROME: Primary | ICD-10-CM

## 2025-04-17 DIAGNOSIS — D72.829 LEUKOCYTOSIS, UNSPECIFIED TYPE: ICD-10-CM

## 2025-04-17 LAB
C TRACH RRNA SPEC QL NAA+PROBE: NOT DETECTED
N GONORRHOEA RRNA SPEC QL NAA+PROBE: NOT DETECTED
QUEST GC CT AMPLIFIED (ALWAYS MESSAGE): NORMAL

## 2025-04-18 LAB — BACTERIA UR CULT: NORMAL

## 2025-04-21 ENCOUNTER — TELEPHONE (OUTPATIENT)
Dept: PRIMARY CARE | Facility: CLINIC | Age: 35
End: 2025-04-21
Payer: COMMERCIAL

## 2025-04-21 RX ORDER — METFORMIN HYDROCHLORIDE 500 MG/1
500 TABLET ORAL
Qty: 200 TABLET | Refills: 3 | Status: SHIPPED | OUTPATIENT
Start: 2025-04-21 | End: 2026-05-26

## 2025-06-17 ENCOUNTER — APPOINTMENT (OUTPATIENT)
Dept: HEMATOLOGY/ONCOLOGY | Facility: CLINIC | Age: 35
End: 2025-06-17
Payer: COMMERCIAL

## 2025-06-17 DIAGNOSIS — D64.9 ANEMIA, UNSPECIFIED TYPE: Primary | ICD-10-CM

## 2025-07-21 ENCOUNTER — TELEPHONE (OUTPATIENT)
Dept: HEMATOLOGY/ONCOLOGY | Facility: CLINIC | Age: 35
End: 2025-07-21
Payer: COMMERCIAL

## 2025-07-21 NOTE — TELEPHONE ENCOUNTER
Called and LM for patient that his New Patient Hematology Consult with Dr. Chemo Hendrickson 07/22/2025 was cancelled since patient did not complete the labs prior as requested by the provider. I asked for patient to please call the office back to reschedule.

## 2025-07-22 ENCOUNTER — APPOINTMENT (OUTPATIENT)
Dept: HEMATOLOGY/ONCOLOGY | Facility: CLINIC | Age: 35
End: 2025-07-22
Payer: COMMERCIAL

## 2025-07-22 DIAGNOSIS — D72.829 LEUKOCYTOSIS, UNSPECIFIED TYPE: Primary | ICD-10-CM
